# Patient Record
Sex: FEMALE | Race: WHITE | NOT HISPANIC OR LATINO | URBAN - METROPOLITAN AREA
[De-identification: names, ages, dates, MRNs, and addresses within clinical notes are randomized per-mention and may not be internally consistent; named-entity substitution may affect disease eponyms.]

---

## 2023-03-02 DIAGNOSIS — Z12.11 COLON CANCER SCREENING: Primary | ICD-10-CM

## 2023-06-27 VITALS — WEIGHT: 210 LBS | BODY MASS INDEX: 32.96 KG/M2 | HEIGHT: 67 IN

## 2023-06-27 DIAGNOSIS — Z86.010 PERSONAL HISTORY OF COLONIC POLYPS: Primary | ICD-10-CM

## 2023-06-27 RX ORDER — CITALOPRAM 20 MG/1
20 TABLET, FILM COATED ORAL
COMMUNITY
Start: 2023-05-21

## 2023-06-27 RX ORDER — OXYBUTYNIN CHLORIDE 10 MG/1
10 TABLET, EXTENDED RELEASE ORAL
COMMUNITY
Start: 2023-05-14

## 2023-06-27 RX ORDER — ATORVASTATIN CALCIUM 40 MG/1
TABLET, FILM COATED ORAL
COMMUNITY
Start: 2023-06-22

## 2023-06-27 RX ORDER — HYDROCHLOROTHIAZIDE 25 MG/1
25 TABLET ORAL
COMMUNITY
Start: 2023-05-21

## 2023-06-27 NOTE — TELEPHONE ENCOUNTER
----- Message from Dayanna Ewing MA sent at 6/14/2023  2:38 PM CDT -----  Regarding: FW: Colonoscopy  Contact: patient    ----- Message -----  From: Nemo Brennan  Sent: 6/14/2023   2:05 PM CDT  To: [facility] Staff  Subject: Colonoscopy                                      Per phone call with patient, she would like to be schedule an appointment to have a colonoscopy to be done.  Please return call at [phone] (home) or [phone].    Thanks,  SJ

## 2023-06-27 NOTE — TELEPHONE ENCOUNTER
----- Message from Kassie Carty sent at 6/27/2023  3:40 PM CDT -----  Patient is calling back in regards to never receive e-mail..Please call her back [phone]

## 2023-06-28 RX ORDER — SODIUM, POTASSIUM,MAG SULFATES 17.5-3.13G
1 SOLUTION, RECONSTITUTED, ORAL ORAL ONCE
Qty: 1 KIT | Refills: 0 | Status: SHIPPED | OUTPATIENT
Start: 2023-06-28 | End: 2023-06-28

## 2023-08-16 ENCOUNTER — TELEPHONE (OUTPATIENT)
Dept: GASTROENTEROLOGY | Facility: CLINIC | Age: 63
End: 2023-08-16
Payer: COMMERCIAL

## 2023-08-16 NOTE — TELEPHONE ENCOUNTER
Reached out to pt and got location updated and pre reg number and arrival time 1 day before pt voiced understood information. Pt voiced she needed to change her date due to insurance.

## 2023-10-03 ENCOUNTER — TELEPHONE (OUTPATIENT)
Dept: GASTROENTEROLOGY | Facility: CLINIC | Age: 63
End: 2023-10-03
Payer: COMMERCIAL

## 2023-10-03 NOTE — TELEPHONE ENCOUNTER
Patient was notified of her procedure moving from 11/6/23 to 12/20/23 w/ [facility]. Patient voiced understanding. - dmp

## 2023-12-12 ENCOUNTER — TELEPHONE (OUTPATIENT)
Dept: GASTROENTEROLOGY | Facility: CLINIC | Age: 63
End: 2023-12-12
Payer: COMMERCIAL

## 2023-12-12 VITALS — WEIGHT: 210 LBS | HEIGHT: 67 IN | BODY MASS INDEX: 32.96 KG/M2

## 2023-12-12 DIAGNOSIS — Z12.11 COLON CANCER SCREENING: ICD-10-CM

## 2023-12-12 DIAGNOSIS — Z86.010 HISTORY OF COLON POLYPS: Primary | ICD-10-CM

## 2023-12-12 NOTE — TELEPHONE ENCOUNTER
"[facility]  [address] [place] [address]  Phone: [phone]  Fax: [phone]    History & Physical         Provider: Dr. Sonny Abdul    Patient Name: Claudette MONTANO (age):1960  63 y.o.           Gender: female   Phone: [phone]     Referring Physician: Yocasta Kennedy (Inactive)     Vital Signs:   Height - 5' 7"  Weight - 210 LB  BMI -  32.89    Plan: COLONOSCOPY @ [facility]    Encounter Diagnoses   Name Primary?    Colon cancer screening     History of colon polyps Yes           History:      Past Medical History:   Diagnosis Date    Anxiety disorder, unspecified     BMI 32.0-32.9,adult     High cholesterol     Kidney stones     Overactive bladder       Past Surgical History:   Procedure Laterality Date    BUNIONECTOMY  2018    COLONOSCOPY  2019    REPAIR OF RECTOCELE      [id number]      Medication List with Changes/Refills   Current Medications    ATORVASTATIN (LIPITOR) 40 MG TABLET        CITALOPRAM (CELEXA) 20 MG TABLET    Take 20 mg by mouth.    HYDROCHLOROTHIAZIDE (HYDRODIURIL) 25 MG TABLET    Take 25 mg by mouth.    OXYBUTYNIN (DITROPAN-XL) 10 MG 24 HR TABLET    Take 10 mg by mouth.      Review of patient's allergies indicates:  No Known Allergies   Family History   Problem Relation Age of Onset    No Known Problems Mother     Kidney failure Father 85    No Known Problems Sister     No Known Problems Brother     No Known Problems Maternal Grandmother     No Known Problems Maternal Grandfather     No Known Problems Paternal Grandmother     No Known Problems Paternal Grandfather       Social History     Tobacco Use    Smoking status: Every Day     Types: Cigarettes    Smokeless tobacco: Never   Substance Use Topics    Alcohol use: Never    Drug use: Never        Physical Examination:     General Appearance:___________________________  HEENT: " _____________________________________  Abdomen:____________________________________  Heart:________________________________________  Lungs:_______________________________________  Extremities:___________________________________  Skin:_________________________________________  Endocrine:____________________________________  Genitourinary:_________________________________  Neurological:__________________________________      Patient has been evaluated immediately prior to sedation and is medically cleared for endoscopy with IVCS as an ASA class: ______      Physician Signature: _________________________       Date: ________  Time: ________

## 2023-12-13 NOTE — TELEPHONE ENCOUNTER
S/w pt and told her that I was calling as a courtesy regarding upcoming COLON with RMM on 12/20/23 Wed and wanted to verify that she had her prep instructions and meds. Pt stated she had the meds but needs a new copy of the instructions emailed to [email or web address]. I also mentioned that [facility] will call on Tues with her arrival time. Zoraida.

## 2023-12-20 ENCOUNTER — OUTSIDE PLACE OF SERVICE (OUTPATIENT)
Dept: GASTROENTEROLOGY | Facility: CLINIC | Age: 63
End: 2023-12-20

## 2023-12-20 LAB — CRC RECOMMENDATION EXT: NORMAL

## 2023-12-20 PROCEDURE — 45385 PR COLONOSCOPY,REMV LESN,SNARE: ICD-10-PCS | Mod: 33,,, | Performed by: INTERNAL MEDICINE

## 2023-12-20 PROCEDURE — 45385 COLONOSCOPY W/LESION REMOVAL: CPT | Mod: 33,,, | Performed by: INTERNAL MEDICINE

## 2023-12-26 ENCOUNTER — TELEPHONE (OUTPATIENT)
Dept: GASTROENTEROLOGY | Facility: CLINIC | Age: 63
End: 2023-12-26
Payer: COMMERCIAL

## 2023-12-26 NOTE — TELEPHONE ENCOUNTER
----- Message from Sonny Abdul MD sent at 12/26/2023  9:55 AM CST -----  Call with results, polyps ok-repeat colon in 3 yrs.

## 2023-12-27 ENCOUNTER — DOCUMENTATION ONLY (OUTPATIENT)
Dept: GASTROENTEROLOGY | Facility: CLINIC | Age: 63
End: 2023-12-27
Payer: COMMERCIAL